# Patient Record
Sex: MALE | Race: BLACK OR AFRICAN AMERICAN | Employment: FULL TIME | URBAN - METROPOLITAN AREA
[De-identification: names, ages, dates, MRNs, and addresses within clinical notes are randomized per-mention and may not be internally consistent; named-entity substitution may affect disease eponyms.]

---

## 2017-05-16 ENCOUNTER — TELEPHONE (OUTPATIENT)
Dept: FAMILY MEDICINE CLINIC | Facility: CLINIC | Age: 47
End: 2017-05-16

## 2017-05-16 NOTE — TELEPHONE ENCOUNTER
Pt is calling state that he has a form   from his employment that need to be sign by Dr Belle Pruitt stating that it ok for him to keep his facial hair due to irritation   Pt state that this is a micky office form that they will be permanently place into his file.

## 2017-05-22 ENCOUNTER — OFFICE VISIT (OUTPATIENT)
Dept: FAMILY MEDICINE CLINIC | Facility: CLINIC | Age: 47
End: 2017-05-22

## 2017-05-22 VITALS
RESPIRATION RATE: 16 BRPM | TEMPERATURE: 98 F | HEIGHT: 70 IN | WEIGHT: 241 LBS | HEART RATE: 69 BPM | BODY MASS INDEX: 34.5 KG/M2 | SYSTOLIC BLOOD PRESSURE: 150 MMHG | DIASTOLIC BLOOD PRESSURE: 88 MMHG

## 2017-05-22 DIAGNOSIS — L73.1 PSEUDOFOLLICULITIS BARBAE: Primary | ICD-10-CM

## 2017-05-22 PROCEDURE — 99212 OFFICE O/P EST SF 10 MIN: CPT | Performed by: FAMILY MEDICINE

## 2017-05-22 PROCEDURE — 99213 OFFICE O/P EST LOW 20 MIN: CPT | Performed by: FAMILY MEDICINE

## 2017-05-22 NOTE — PROGRESS NOTES
HPI:    Tiffany Aschoff is a 41year old male presents to clinic requesting forms be filled out for work. Patient has PFB and cannot shave as frequently as he needs to for work, his job requires him to be clean shaven.  Dr. Syed Farr write him a letter but he Referrals:  None     Patient verbalized understanding. No barriers to learning observed.    5/22/2017  Kelsey Salgado MD

## 2017-05-24 ENCOUNTER — HOSPITAL ENCOUNTER (EMERGENCY)
Facility: HOSPITAL | Age: 47
Discharge: HOME | End: 2017-05-24
Attending: EMERGENCY MEDICINE
Payer: COMMERCIAL

## 2017-05-24 VITALS
TEMPERATURE: 98 F | SYSTOLIC BLOOD PRESSURE: 147 MMHG | OXYGEN SATURATION: 98 % | RESPIRATION RATE: 16 BRPM | HEIGHT: 70 IN | DIASTOLIC BLOOD PRESSURE: 77 MMHG | HEART RATE: 64 BPM | WEIGHT: 241 LBS | BODY MASS INDEX: 34.5 KG/M2

## 2017-05-24 DIAGNOSIS — T78.40XA ALLERGIC REACTION, INITIAL ENCOUNTER: ICD-10-CM

## 2017-05-24 DIAGNOSIS — L73.1 PSEUDOFOLLICULITIS BARBAE: Primary | ICD-10-CM

## 2017-05-24 PROCEDURE — 99283 EMERGENCY DEPT VISIT LOW MDM: CPT

## 2017-05-24 RX ORDER — PREDNISONE 20 MG/1
40 TABLET ORAL DAILY
Qty: 10 TABLET | Refills: 0 | Status: SHIPPED | OUTPATIENT
Start: 2017-05-24 | End: 2017-05-29

## 2017-05-24 RX ORDER — CLINDAMYCIN AND BENZOYL PEROXIDE 10; 50 MG/G; MG/G
1 GEL TOPICAL DAILY
Qty: 35 G | Refills: 0 | Status: SHIPPED | OUTPATIENT
Start: 2017-05-24 | End: 2017-05-31

## 2017-05-24 NOTE — ED NOTES
Milagro Kelsey is here for eval of itching/swelling to face, which began on Monday after applying [employer] to his herbert. No pain. No airway complaints. He is in NAD. Will continue to monitor.

## 2017-05-24 NOTE — ED PROVIDER NOTES
Patient Seen in: [facility] Emergency Department    History   No chief complaint on file. Stated Complaint: Facial swelling    HPI    Pt is 42 yo M who p/w facial swelling x 1 day. Pt states symptoms started after using hair dye for his beard. Current:/77 mmHg  Pulse 64  Temp(Src) 98.3 °F (36.8 °C) (Oral)  Resp 16  Ht 177.8 cm (5' 10\")  Wt 109.317 kg  BMI 34.58 kg/m2  SpO2 98%        Physical Exam    GENERAL: No acute distress, awake and alert  HEENT: MMM, EOMI, PERRL.  Oropharynx no

## 2017-05-24 NOTE — ED INITIAL ASSESSMENT (HPI)
Used just for men dye on Monday, next day had itching to face and swelling. Patient states that he tried taking benadryl capsules and hydrocortisone cream to area, states that swelling is worse upon waking up and has pus coming out.

## 2017-05-25 ENCOUNTER — OFFICE VISIT (OUTPATIENT)
Dept: FAMILY MEDICINE CLINIC | Facility: CLINIC | Age: 47
End: 2017-05-25

## 2017-05-25 VITALS
TEMPERATURE: 98 F | SYSTOLIC BLOOD PRESSURE: 130 MMHG | BODY MASS INDEX: 34.84 KG/M2 | DIASTOLIC BLOOD PRESSURE: 84 MMHG | HEART RATE: 78 BPM | RESPIRATION RATE: 20 BRPM | HEIGHT: 70 IN | WEIGHT: 243.38 LBS

## 2017-05-25 DIAGNOSIS — L03.211 FACIAL CELLULITIS: ICD-10-CM

## 2017-05-25 DIAGNOSIS — L23.4: Primary | ICD-10-CM

## 2017-05-25 PROBLEM — L25.2: Status: ACTIVE | Noted: 2017-05-25

## 2017-05-25 PROCEDURE — 99212 OFFICE O/P EST SF 10 MIN: CPT | Performed by: FAMILY MEDICINE

## 2017-05-25 PROCEDURE — 99213 OFFICE O/P EST LOW 20 MIN: CPT | Performed by: FAMILY MEDICINE

## 2017-05-25 RX ORDER — DOXYCYCLINE HYCLATE 100 MG/1
100 CAPSULE ORAL 2 TIMES DAILY
Qty: 14 CAPSULE | Refills: 0 | Status: SHIPPED | OUTPATIENT
Start: 2017-05-25 | End: 2017-06-01

## 2017-05-25 NOTE — PATIENT INSTRUCTIONS
Continue with prednisone and clindamycin topical. Add doxycycline oral. Hydrate well. Wash face with water and/or fran's baby shampoo.

## 2017-05-25 NOTE — PROGRESS NOTES
HPI:    Patient ID: Cherry Delong is a 41year old male. Skin  This is a new problem. The current episode started in the past 7 days. The problem occurs constantly. The problem has been gradually improving. Exacerbated by:  Initial insult from beard dye Sig: Take 1 capsule (100 mg total) by mouth 2 (two) times daily. Imaging & Referrals:  None  Patient Instructions   Continue with prednisone and clindamycin topical. Add doxycycline oral. Hydrate well.  Wash face with water and/or fran's bab

## 2017-09-18 ENCOUNTER — HOSPITAL ENCOUNTER (EMERGENCY)
Facility: HOSPITAL | Age: 47
Discharge: HOME | End: 2017-09-18
Payer: COMMERCIAL

## 2017-09-18 ENCOUNTER — APPOINTMENT (OUTPATIENT)
Dept: GENERAL RADIOLOGY | Facility: HOSPITAL | Age: 47
End: 2017-09-18
Payer: COMMERCIAL

## 2017-09-18 VITALS
DIASTOLIC BLOOD PRESSURE: 76 MMHG | BODY MASS INDEX: 33.64 KG/M2 | SYSTOLIC BLOOD PRESSURE: 145 MMHG | RESPIRATION RATE: 18 BRPM | HEIGHT: 70 IN | OXYGEN SATURATION: 98 % | TEMPERATURE: 97 F | HEART RATE: 61 BPM | WEIGHT: 235 LBS

## 2017-09-18 DIAGNOSIS — M62.838 TRAPEZIUS MUSCLE SPASM: Primary | ICD-10-CM

## 2017-09-18 PROCEDURE — 99283 EMERGENCY DEPT VISIT LOW MDM: CPT

## 2017-09-18 PROCEDURE — 73030 X-RAY EXAM OF SHOULDER: CPT

## 2017-09-18 PROCEDURE — 96372 THER/PROPH/DIAG INJ SC/IM: CPT

## 2017-09-18 RX ORDER — KETOROLAC TROMETHAMINE 30 MG/ML
60 INJECTION, SOLUTION INTRAMUSCULAR; INTRAVENOUS ONCE
Status: COMPLETED | OUTPATIENT
Start: 2017-09-18 | End: 2017-09-18

## 2017-09-18 RX ORDER — DIAZEPAM 2 MG/1
2 TABLET ORAL 3 TIMES DAILY PRN
Qty: 20 TABLET | Refills: 0 | Status: SHIPPED | OUTPATIENT
Start: 2017-09-18 | End: 2017-09-25

## 2017-09-18 NOTE — ED PROVIDER NOTES
Patient Seen in: [facility] AND [facility] Emergency Department    History   Patient presents with:  Upper Extremity Injury (musculoskeletal)    Stated Complaint: right shoulder pain    HPI    41year old male complains of pain and spasm in right shoulder muscle f Current:/76   Pulse 61   Temp (!) 97 °F (36.1 °C) (Temporal)   Resp 18   Ht 177.8 cm (5' 10\")   Wt 106.6 kg   SpO2 98%   BMI 33.72 kg/m²         Physical Exam   Constitutional: He is oriented to person, place, and time. He is cooperative.   Non-toxic INDICATIONS: Increasing right shoulder pain x one week. No known trauma. TECHNIQUE: 4 views were obtained. FINDINGS:     BONES: No acute fracture or dislocation is apparent.  There is a     corticated-appearing ossific fragment is 1.0 c Plan: NSAIDs and muscle relaxer, stretching exercises, follow-up with primary care supportive care. Any diagnostic tests performed here were reviewed and questions answered.  Diagnosis, care plan, treatment options, and a number of associated acute manageme

## 2017-09-20 ENCOUNTER — TELEPHONE (OUTPATIENT)
Dept: FAMILY MEDICINE CLINIC | Facility: CLINIC | Age: 47
End: 2017-09-20

## 2017-09-20 ENCOUNTER — OFFICE VISIT (OUTPATIENT)
Dept: FAMILY MEDICINE CLINIC | Facility: CLINIC | Age: 47
End: 2017-09-20

## 2017-09-20 VITALS
DIASTOLIC BLOOD PRESSURE: 84 MMHG | WEIGHT: 237 LBS | TEMPERATURE: 98 F | HEIGHT: 70 IN | BODY MASS INDEX: 33.93 KG/M2 | HEART RATE: 62 BPM | RESPIRATION RATE: 16 BRPM | SYSTOLIC BLOOD PRESSURE: 132 MMHG

## 2017-09-20 DIAGNOSIS — M99.01 CERVICOTHORACIC SOMATIC DYSFUNCTION: Primary | ICD-10-CM

## 2017-09-20 PROBLEM — M89.8X1 PAIN OF RIGHT SCAPULA: Status: ACTIVE | Noted: 2017-09-20

## 2017-09-20 PROCEDURE — 98927 OSTEOPATH MANJ 5-6 REGIONS: CPT | Performed by: FAMILY MEDICINE

## 2017-09-20 PROCEDURE — 99214 OFFICE O/P EST MOD 30 MIN: CPT | Performed by: FAMILY MEDICINE

## 2017-09-20 PROCEDURE — 99212 OFFICE O/P EST SF 10 MIN: CPT | Performed by: FAMILY MEDICINE

## 2017-09-20 RX ORDER — METHYLPREDNISOLONE 4 MG/1
TABLET ORAL
Qty: 1 KIT | Refills: 0 | Status: SHIPPED | OUTPATIENT
Start: 2017-09-20 | End: 2020-11-05 | Stop reason: ALTCHOICE

## 2017-09-20 RX ORDER — CYCLOBENZAPRINE HCL 10 MG
10 TABLET ORAL NIGHTLY
Qty: 30 TABLET | Refills: 0 | Status: SHIPPED | OUTPATIENT
Start: 2017-09-20 | End: 2017-09-20

## 2017-09-20 RX ORDER — CYCLOBENZAPRINE HCL 10 MG
10 TABLET ORAL NIGHTLY
Qty: 30 TABLET | Refills: 0 | Status: SHIPPED | OUTPATIENT
Start: 2017-09-20 | End: 2020-11-05 | Stop reason: ALTCHOICE

## 2017-09-20 NOTE — PROGRESS NOTES
HPI:    Patient ID: Shelby De La Rosa is a 41year old male. Back Pain   This is a new problem. Episode onset: 4 days ago after chiropractor treatment. The problem occurs constantly. The problem has been gradually worsening since onset.  The pain is present - CHIROPRACTIC  - INTERNAL      Orders Placed This Encounter      OSTEOPATHIC MANIP,5-6 BODY REGN    Meds This Visit:  Signed Prescriptions Disp Refills    methylPREDNISolone (MEDROL) 4 MG Oral Tablet Therapy Pack 1 kit 0      Sig: As directed.       Cyclob

## 2017-09-20 NOTE — TELEPHONE ENCOUNTER
Patient had office visit with Dr Ev Oswald today for recent ER visit for muscle spasm and received adjustment and prescription for medrol and cyclobenzaprine. Pt states he was in such a hurry that he forgot to ask for something for pain.  Pt states the medic

## 2017-09-20 NOTE — TELEPHONE ENCOUNTER
What he has is for pain and spasm. He will need to continue with taking this medication. If pain persist I will consider some plain films to make sure nothing else is going on in his lungs or chest wall.

## 2017-09-25 NOTE — TELEPHONE ENCOUNTER
Contacted pt report feeling a little better but pain still present. Denies any sob and pain level at 7. Tasked to Dr Arnie Ramos.

## 2017-09-26 NOTE — TELEPHONE ENCOUNTER
Reviewed doctor's recommendations with pt. Pt states he has not been taking the Cyclobenzaprine as [facility] pharmacy told him that prescription wasn't received. Pt states he will start taking the med tonight.  Pt states pain is worse when he is up and moving jeanie

## 2017-12-28 ENCOUNTER — HOSPITAL ENCOUNTER (EMERGENCY)
Facility: HOSPITAL | Age: 47
Discharge: HOME | End: 2017-12-28
Attending: EMERGENCY MEDICINE
Payer: COMMERCIAL

## 2017-12-28 VITALS
SYSTOLIC BLOOD PRESSURE: 154 MMHG | HEIGHT: 70 IN | TEMPERATURE: 99 F | DIASTOLIC BLOOD PRESSURE: 88 MMHG | OXYGEN SATURATION: 99 % | HEART RATE: 84 BPM | WEIGHT: 235 LBS | RESPIRATION RATE: 18 BRPM | BODY MASS INDEX: 33.64 KG/M2

## 2017-12-28 DIAGNOSIS — L03.211 CELLULITIS, FACE: Primary | ICD-10-CM

## 2017-12-28 PROCEDURE — 99283 EMERGENCY DEPT VISIT LOW MDM: CPT

## 2017-12-28 RX ORDER — DOXYCYCLINE HYCLATE 100 MG/1
100 CAPSULE ORAL 2 TIMES DAILY
Qty: 20 CAPSULE | Refills: 0 | Status: SHIPPED | OUTPATIENT
Start: 2017-12-28 | End: 2018-01-07

## 2017-12-28 RX ORDER — PREDNISONE 20 MG/1
40 TABLET ORAL DAILY
Qty: 10 TABLET | Refills: 0 | Status: SHIPPED | OUTPATIENT
Start: 2017-12-28 | End: 2018-01-02

## 2017-12-28 NOTE — ED NOTES
Pt reports using a dye to her face to his beard. Pt has swelling noted to the lower facial aspect of his face. No sign of resp distress noted. Bumps are noted in the beard area.  Pt reports taking benadryl pta

## 2017-12-30 NOTE — ED PROVIDER NOTES
Patient Seen in: [facility] AND [facility] Emergency Department    History   Patient presents with: Allergic Rxn Allergies (immune)    Stated Complaint: allergic rx - face swollen     HPI    17-year-old male presents for complaint of facial swelling.   Patient r air)    Current:/88   Pulse 84   Temp 99.3 °F (37.4 °C) (Oral)   Resp 18   Ht 177.8 cm (5' 10\")   Wt 106.6 kg   SpO2 99%   BMI 33.72 kg/m²         Physical Exam   Constitutional: He is oriented to person, place, and time.  He appears well-developed a Patient voices understanding and agreement with the treatment plan. All questions were addressed and answered.                     Disposition and Plan     Clinical Impression:  Cellulitis, face  (primary encounter diagnosis)    Disposition:  Discharge  12/

## 2020-11-05 ENCOUNTER — VIRTUAL PHONE E/M (OUTPATIENT)
Dept: FAMILY MEDICINE CLINIC | Facility: CLINIC | Age: 50
End: 2020-11-05
Payer: COMMERCIAL

## 2020-11-05 ENCOUNTER — LAB ENCOUNTER (OUTPATIENT)
Dept: LAB | Facility: HOSPITAL | Age: 50
End: 2020-11-05
Attending: NURSE PRACTITIONER
Payer: COMMERCIAL

## 2020-11-05 DIAGNOSIS — Z20.822 EXPOSURE TO COVID-19 VIRUS: ICD-10-CM

## 2020-11-05 DIAGNOSIS — Z20.822 ENCOUNTER BY TELEHEALTH FOR SUSPECTED COVID-19: Primary | ICD-10-CM

## 2020-11-05 DIAGNOSIS — Z20.822 EXPOSURE TO COVID-19 VIRUS: Primary | ICD-10-CM

## 2020-11-05 PROCEDURE — 99203 OFFICE O/P NEW LOW 30 MIN: CPT | Performed by: NURSE PRACTITIONER

## 2020-11-05 NOTE — PROGRESS NOTES
HPI    Virtual Telephone Check-In    Timmy Vegas verbally consents to a Virtual/Telephone Check-In visit on 11/05/20. Patient has been referred to the [facility] website at [email or web address] to review the yearly Consent to Treat document.     Patient u Occupational History      Not on file    Social Needs      Financial resource strain: Not on file      Food insecurity        Worry: Not on file        Inability: Not on file      Transportation needs        Medical: Not on file        Non-medical: Not on medications on file. Allergies:    Shrimp                      Physical Exam   Nursing note reviewed. Pulmonary/Chest: No respiratory distress. Patient able to speak in full sentences throughout entire telephone visit.   No respiratory distress no

## 2020-11-09 ENCOUNTER — TELEPHONE (OUTPATIENT)
Dept: FAMILY MEDICINE CLINIC | Facility: CLINIC | Age: 50
End: 2020-11-09

## 2020-11-09 RX ORDER — BENZONATATE 200 MG/1
200 CAPSULE ORAL 3 TIMES DAILY PRN
Qty: 30 CAPSULE | Refills: 0 | Status: SHIPPED | OUTPATIENT
Start: 2020-11-09

## 2020-11-09 NOTE — TELEPHONE ENCOUNTER
Spoke with patient (name and  verified). Informed of message below. Patient is asking for a strong cough medication that can be prescribed. Please advise.

## 2020-11-09 NOTE — TELEPHONE ENCOUNTER
Scot Rothman for [email or web address] please see pt message below. Pt wants to get something for his cough and that will help bring up his phlegm. Please Advise  For his body aches pt was advised to use Tylenol or Ibuprofen.

## 2020-11-09 NOTE — TELEPHONE ENCOUNTER
Pt calling for COVID results. Results are positive. States he is coughing, short of breath with exertion, lack of appetite, headache, \"sweating\" but doesn't have a thermometer. Encouraged to isolate, good hand hygiene, take Ibuprofen, hydrate.  Pt also re

## 2020-11-09 NOTE — TELEPHONE ENCOUNTER
muccinex works well to help break up mucus and one of the medications that seems to work well for the coughing,  Make certain to drink lots of liquids, hot showers, lots of rest. To ER if coughing or sob worsen.

## 2020-11-10 ENCOUNTER — APPOINTMENT (OUTPATIENT)
Dept: GENERAL RADIOLOGY | Facility: HOSPITAL | Age: 50
End: 2020-11-10
Attending: EMERGENCY MEDICINE
Payer: COMMERCIAL

## 2020-11-10 ENCOUNTER — HOSPITAL ENCOUNTER (EMERGENCY)
Facility: HOSPITAL | Age: 50
Discharge: HOME | End: 2020-11-10
Payer: COMMERCIAL

## 2020-11-10 ENCOUNTER — APPOINTMENT (OUTPATIENT)
Dept: CT IMAGING | Facility: HOSPITAL | Age: 50
End: 2020-11-10
Attending: NURSE PRACTITIONER
Payer: COMMERCIAL

## 2020-11-10 ENCOUNTER — HOSPITAL ENCOUNTER (EMERGENCY)
Facility: HOSPITAL | Age: 50
Discharge: HOME | End: 2020-11-10
Attending: EMERGENCY MEDICINE
Payer: COMMERCIAL

## 2020-11-10 VITALS
TEMPERATURE: 100 F | BODY MASS INDEX: 34.36 KG/M2 | HEART RATE: 107 BPM | SYSTOLIC BLOOD PRESSURE: 151 MMHG | RESPIRATION RATE: 16 BRPM | WEIGHT: 240 LBS | OXYGEN SATURATION: 95 % | DIASTOLIC BLOOD PRESSURE: 81 MMHG | HEIGHT: 70 IN

## 2020-11-10 VITALS
DIASTOLIC BLOOD PRESSURE: 82 MMHG | OXYGEN SATURATION: 94 % | HEIGHT: 70 IN | WEIGHT: 240 LBS | BODY MASS INDEX: 34.36 KG/M2 | RESPIRATION RATE: 24 BRPM | SYSTOLIC BLOOD PRESSURE: 143 MMHG | TEMPERATURE: 99 F | HEART RATE: 102 BPM

## 2020-11-10 DIAGNOSIS — U07.1 PNEUMONIA DUE TO COVID-19 VIRUS: Primary | ICD-10-CM

## 2020-11-10 DIAGNOSIS — J12.82 PNEUMONIA DUE TO COVID-19 VIRUS: Primary | ICD-10-CM

## 2020-11-10 DIAGNOSIS — U07.1 COVID-19 VIRUS INFECTION: Primary | ICD-10-CM

## 2020-11-10 PROCEDURE — 99283 EMERGENCY DEPT VISIT LOW MDM: CPT

## 2020-11-10 PROCEDURE — 93005 ELECTROCARDIOGRAM TRACING: CPT

## 2020-11-10 PROCEDURE — 85025 COMPLETE CBC W/AUTO DIFF WBC: CPT | Performed by: NURSE PRACTITIONER

## 2020-11-10 PROCEDURE — 99284 EMERGENCY DEPT VISIT MOD MDM: CPT

## 2020-11-10 PROCEDURE — 84484 ASSAY OF TROPONIN QUANT: CPT | Performed by: NURSE PRACTITIONER

## 2020-11-10 PROCEDURE — 71045 X-RAY EXAM CHEST 1 VIEW: CPT | Performed by: EMERGENCY MEDICINE

## 2020-11-10 PROCEDURE — 80048 BASIC METABOLIC PNL TOTAL CA: CPT | Performed by: NURSE PRACTITIONER

## 2020-11-10 PROCEDURE — 96360 HYDRATION IV INFUSION INIT: CPT

## 2020-11-10 PROCEDURE — 85379 FIBRIN DEGRADATION QUANT: CPT | Performed by: NURSE PRACTITIONER

## 2020-11-10 PROCEDURE — 71260 CT THORAX DX C+: CPT | Performed by: NURSE PRACTITIONER

## 2020-11-10 PROCEDURE — 93010 ELECTROCARDIOGRAM REPORT: CPT | Performed by: EMERGENCY MEDICINE

## 2020-11-10 RX ORDER — ALBUTEROL SULFATE 90 UG/1
2 AEROSOL, METERED RESPIRATORY (INHALATION) EVERY 4 HOURS PRN
Qty: 1 INHALER | Refills: 0 | Status: SHIPPED | OUTPATIENT
Start: 2020-11-10 | End: 2020-12-10

## 2020-11-10 NOTE — ED PROVIDER NOTES
Patient Seen in: [facility] AND [facility] Emergency Department      History   Patient presents with:  Difficulty Breathing    Stated Complaint: covid, shortness of breath    HPI    61-year-old male known positive Covid from a test dated November shift here with Pupils are equal, round, and reactive to light. Neck: Normal range of motion. Neck supple. Cardiovascular: Normal rate, regular rhythm and intact distal pulses. Pulmonary/Chest: Effort normal. No respiratory distress.   No significant wheeze or crack by (CST): Willow Corrales MD on 11/10/2020 at 8:35 AM                  MDM      Patient was not hypoxic with ambulatory pulse ox. He will be sent home with a pulse oximeter. He will be given Ventolin for his cough.   He knows to quarantine and stay hydrated

## 2020-11-10 NOTE — ED INITIAL ASSESSMENT (HPI)
Pt from home with complaints of shortness of breath and difficulty breathing. Positive covid test 11/6/20.

## 2020-11-11 NOTE — ED NOTES
Pt a/ox4, respirations unlabored, speaking full clear sentences, gait steady, no acute distress. All ED orders completed.    Pt instructed to return to ED for any new/severe s/s or as directed by provider, and to see PMD/specialist ASAP or as directed by pr

## 2020-11-11 NOTE — ED PROVIDER NOTES
Patient Seen in: [facility] AND [facility] Emergency Department      History   Patient presents with:  Difficulty Breathing    Stated Complaint:     51yo/m with hx of anxiety, HTN reports to the ED with complaints of dyspnea.  Patient reports a covid + diagnosis acute distress. Appearance: He is well-developed. HENT:      Head: Normocephalic and atraumatic. Eyes:      Conjunctiva/sclera: Conjunctivae normal.      Pupils: Pupils are equal, round, and reactive to light.    Neck:      Musculoskeletal: Normal r for these tests on the individual orders.    SCAN SLIDE   CBC W/ DIFFERENTIAL     Narrative     PROCEDURE: CT CHEST PE AORTA (IV ONLY) (CPT=71260)       COMPARISON: None.       INDICATIONS: +covid, elevated dimer, tachypnea, dyspnea       TECHNIQUE: CT imag dilated 2.9 centimeters transversely.    THORACIC AORTA: Normal size for age.  No aneurysm or dissection.     PLEURA: No mass or effusion.     CHEST WALL: No axillary mass or enlarged adenopathy.     LIMITED ABDOMEN: Limited images of the upper abdomen demo Impression:  Pneumonia due to COVID-19 virus  (primary encounter diagnosis)    Disposition:  Discharge  11/10/2020 10:10 pm    Follow-up:  Nisa Cutler DO  [address]    In 2 days            Medicat

## 2020-11-11 NOTE — ED INITIAL ASSESSMENT (HPI)
Pt to ED with c/o increasing dyspnea that started today. Pt denies chest pain. Pt states +for COVID-19 11-5-2020. Pt 93% on room air. Pt is alert and oriented x4. Pt seen here early today with same complaint. Pt able to speak in full sentences.

## 2020-11-27 ENCOUNTER — NURSE TRIAGE (OUTPATIENT)
Dept: FAMILY MEDICINE CLINIC | Facility: CLINIC | Age: 50
End: 2020-11-27

## 2020-11-27 NOTE — TELEPHONE ENCOUNTER
Action Requested: Summary for Provider     []  Critical Lab, Recommendations Needed  [] Need Additional Advice  []   FYI    []   Need Orders  [] Need Medications Sent to Pharmacy  []  Other     SUMMARY:     Patient called stating since having COVID he has

## 2020-11-27 NOTE — PROGRESS NOTES
Please note that the following visit was completed using two-way, real-time interactive audio and/or video communication.   This has been done in good fiorella to provide continuity of care in the best interest of the provider-patient relationship, due to the 1 tablet (0.5 mg total) by mouth nightly as needed for Sleep. 10 tablet 0   • Albuterol Sulfate  (90 Base) MCG/ACT Inhalation Aero Soln Inhale 2 puffs into the lungs every 4 (four) hours as needed for Wheezing.  1 Inhaler 0   • benzonatate 200 MG Ora worsening symptoms or concerns.   -Pt was agreeable to plan and will comply with discussion above.          Patient reminded to practice good health and safety measures including washing hands, social distancing, covering mouth when coughing/ sneezing, avoi

## 2020-11-29 ENCOUNTER — HOSPITAL ENCOUNTER (EMERGENCY)
Facility: HOSPITAL | Age: 50
Discharge: HOME | End: 2020-11-29
Payer: COMMERCIAL

## 2020-11-29 VITALS
HEIGHT: 70 IN | OXYGEN SATURATION: 97 % | HEART RATE: 75 BPM | DIASTOLIC BLOOD PRESSURE: 94 MMHG | RESPIRATION RATE: 18 BRPM | SYSTOLIC BLOOD PRESSURE: 148 MMHG | WEIGHT: 230 LBS | TEMPERATURE: 99 F | BODY MASS INDEX: 32.93 KG/M2

## 2020-11-29 DIAGNOSIS — R25.1 SHAKINESS: Primary | ICD-10-CM

## 2020-11-29 PROCEDURE — 80053 COMPREHEN METABOLIC PANEL: CPT | Performed by: NURSE PRACTITIONER

## 2020-11-29 PROCEDURE — 96360 HYDRATION IV INFUSION INIT: CPT

## 2020-11-29 PROCEDURE — 85025 COMPLETE CBC W/AUTO DIFF WBC: CPT | Performed by: NURSE PRACTITIONER

## 2020-11-29 PROCEDURE — 99284 EMERGENCY DEPT VISIT MOD MDM: CPT

## 2020-11-30 NOTE — ED PROVIDER NOTES
Patient Seen in: [facility] AND [facility] Emergency Department      History   No chief complaint on file.     Stated Complaint: Check up    51yo/m with hx of anxiety, recent covid infection reports with shakiness going up/down stairs, facial tightness, loss of a Normocephalic and atraumatic. Eyes:      Conjunctiva/sclera: Conjunctivae normal.      Pupils: Pupils are equal, round, and reactive to light. Neck:      Musculoskeletal: Normal range of motion and neck supple.    Cardiovascular:      Rate and Rhythm: N result                 Please view results for these tests on the individual orders.    RAINBOW DRAW BLUE   RAINBOW DRAW LAVENDER   RAINBOW DRAW LIGHT GREEN   RAINBOW DRAW GOLD                  MDM      51yo/m w hx and exam as stated, complaints of shakines

## 2020-11-30 NOTE — ED NOTES
Very extensive discharge discussion with patient. Concern for being able to sleep, also concerns over taking xanax and waking up 'groggy' in the am. This RN discussed pros and cons of medication and care.

## 2020-11-30 NOTE — ED INITIAL ASSESSMENT (HPI)
Pt presents to ED via private vehicle for c/o residual loss of appetite and feeling like \"something is missing in my body\" since having COVID 3 weeks ago.     Pt has history of anxiety

## 2020-12-01 NOTE — PROGRESS NOTES
Please note that the following visit was completed using two-way, real-time interactive audio and/or video communication.   This has been done in good fiorella to provide continuity of care in the best interest of the provider-patient relationship, due to the aware of where to find [facility] notice of privacy practices, telehealth consent form and other related consent forms and documents. which are located on the [facility] website.  The patient verbally agreed to telehealth consent form, related consents and the risks d (Patient not taking: Reported on 11/10/2020 ) 30 capsule 0       Allergies:  Shrimp                         Current Outpatient Medications:   •  LORazepam 0.5 MG Oral Tab, Take 1 tablet (0.5 mg total) by mouth 2 (two) times daily as needed for Anxiety. Radha Nguyen pandemic. Patient verbalized understanding of plan and all questions answered to the best of my ability. Patient to call back if any change/ worsening of symptoms. No orders of the defined types were placed in this encounter.       Meds This Visit:

## 2021-09-02 ENCOUNTER — HOSPITAL ENCOUNTER (EMERGENCY)
Facility: HOSPITAL | Age: 51
Discharge: HOME | End: 2021-09-02
Attending: EMERGENCY MEDICINE
Payer: COMMERCIAL

## 2021-09-02 VITALS
RESPIRATION RATE: 24 BRPM | OXYGEN SATURATION: 95 % | BODY MASS INDEX: 36 KG/M2 | SYSTOLIC BLOOD PRESSURE: 122 MMHG | DIASTOLIC BLOOD PRESSURE: 77 MMHG | HEART RATE: 104 BPM | WEIGHT: 250 LBS | TEMPERATURE: 101 F

## 2021-09-02 DIAGNOSIS — B34.9 VIRAL SYNDROME: Primary | ICD-10-CM

## 2021-09-02 LAB — SARS-COV-2 RNA RESP QL NAA+PROBE: NOT DETECTED

## 2021-09-02 PROCEDURE — 99283 EMERGENCY DEPT VISIT LOW MDM: CPT

## 2021-09-02 RX ORDER — BENZONATATE 100 MG/1
100 CAPSULE ORAL 3 TIMES DAILY PRN
Qty: 30 CAPSULE | Refills: 0 | Status: SHIPPED | OUTPATIENT
Start: 2021-09-02 | End: 2021-10-02

## 2021-09-02 RX ORDER — CODEINE PHOSPHATE AND GUAIFENESIN 10; 100 MG/5ML; MG/5ML
5 SOLUTION ORAL EVERY EVENING
Qty: 50 ML | Refills: 0 | Status: SHIPPED | OUTPATIENT
Start: 2021-09-02 | End: 2021-09-07

## 2021-09-02 NOTE — ED PROVIDER NOTES
Patient Seen in: [facility] AND [facility] Emergency Department      History   Patient presents with:  Cough    Stated Complaint: cough, fever    HPI/Subjective:   HPI    Pt is 55 yo M who p/w 4 days of fever, chills, cough, headache and body aches.  No vomiting am    Follow-up:  Nisa Cutler DO  [address] [id number] [facility]  [phone]    In 2 days            Medications Prescribed:  Current Discharge Medication List    START taking these medications    !! benzonatate 100 MG Oral Cap

## 2021-09-02 NOTE — ED INITIAL ASSESSMENT (HPI)
Pt to ED for cough, HA, subjective fevers and chills since Sunday, negative covid test Monday. Pt is unvaccinated for covid.

## 2021-09-04 ENCOUNTER — TELEPHONE (OUTPATIENT)
Dept: FAMILY MEDICINE CLINIC | Facility: CLINIC | Age: 51
End: 2021-09-04

## 2021-09-04 NOTE — TELEPHONE ENCOUNTER
Spoke with patient--reports feeling feverish (did not take temperature, \"but I'm sweating a lot\"), persistent, intermittent headaches--is taking Tylenol and cough syrup Rxed at 9/02/2021 ER visit, but stopped Marek Hlal, \"because they make my heada

## 2021-09-04 NOTE — TELEPHONE ENCOUNTER
I really do not know where this patient can be placed on my schedule for next week, but if you can find a res 24/or any other so-called reserved appointment, then put him in. Thank you.

## 2021-09-04 NOTE — TELEPHONE ENCOUNTER
Spoke with the patient,verified full name and , informed him of message below and patient declined the appointment. Advised him of symptoms worsen to see medical attention.     Also advised him to place his name on waiting list if any cancellation shoul

## 2021-11-09 ENCOUNTER — TELEPHONE (OUTPATIENT)
Dept: FAMILY MEDICINE CLINIC | Facility: CLINIC | Age: 51
End: 2021-11-09

## 2021-11-09 NOTE — TELEPHONE ENCOUNTER
Patient asking an appointment for prostate exam but next opening is on 12/24. Dr. Jacquie Kaplan can we use a res 24 slot?        Please reply to pool: EM [facility] IM [facility] ALG RHE [[id number]]

## 2021-11-10 NOTE — TELEPHONE ENCOUNTER
Spoke, with the patient and he did schedule an appointment to see Jewel Garza on 11-15-21 at 6:20 res 24 slot was used. Ok 'd per the doctors message below.

## 2021-11-15 ENCOUNTER — OFFICE VISIT (OUTPATIENT)
Dept: FAMILY MEDICINE CLINIC | Facility: CLINIC | Age: 51
End: 2021-11-15
Payer: COMMERCIAL

## 2021-11-15 VITALS
WEIGHT: 256 LBS | HEART RATE: 60 BPM | SYSTOLIC BLOOD PRESSURE: 120 MMHG | HEIGHT: 70 IN | DIASTOLIC BLOOD PRESSURE: 88 MMHG | BODY MASS INDEX: 36.65 KG/M2

## 2021-11-15 DIAGNOSIS — M54.50 CHRONIC MIDLINE LOW BACK PAIN WITHOUT SCIATICA: ICD-10-CM

## 2021-11-15 DIAGNOSIS — R10.33 PERIUMBILICAL PAIN: Primary | ICD-10-CM

## 2021-11-15 DIAGNOSIS — G89.29 CHRONIC MIDLINE LOW BACK PAIN WITHOUT SCIATICA: ICD-10-CM

## 2021-11-15 DIAGNOSIS — Z12.5 PROSTATE CANCER SCREENING: ICD-10-CM

## 2021-11-15 DIAGNOSIS — R14.3 FLATUS: ICD-10-CM

## 2021-11-15 PROCEDURE — 3079F DIAST BP 80-89 MM HG: CPT | Performed by: FAMILY MEDICINE

## 2021-11-15 PROCEDURE — 3008F BODY MASS INDEX DOCD: CPT | Performed by: FAMILY MEDICINE

## 2021-11-15 PROCEDURE — 3074F SYST BP LT 130 MM HG: CPT | Performed by: FAMILY MEDICINE

## 2021-11-15 PROCEDURE — 99214 OFFICE O/P EST MOD 30 MIN: CPT | Performed by: FAMILY MEDICINE

## 2021-11-15 RX ORDER — CYCLOBENZAPRINE HCL 5 MG
5 TABLET ORAL NIGHTLY
Qty: 30 TABLET | Refills: 0 | Status: SHIPPED | OUTPATIENT
Start: 2021-11-15

## 2021-11-16 NOTE — PROGRESS NOTES
Subjective:   Patient ID: Lilian Bullard is a 54year old male. This patient is a 33-year-old gentleman who presents to the clinic with just over a week of periumbilical discomfort of unknown cause.   Patient admits to a lot of dairy products and he has tenderness. Hernia: No hernia is present. Musculoskeletal:      Lumbar back: Spasms and tenderness present. Decreased range of motion. Back:       Comments: Region of discomfort as depicted. Neurological:      Mental Status: He is alert.

## 2021-11-16 NOTE — PATIENT INSTRUCTIONS
GI referral.  PSA has been ordered to evaluate the patient's prostate. Patient has been given the recommendation to use lactose-free or Lactaid version of dairy products. Cyclobenzaprine prescribed to be taken at night starting off with 5 mg.   Patient ha

## 2022-01-20 ENCOUNTER — TELEPHONE (OUTPATIENT)
Dept: FAMILY MEDICINE CLINIC | Facility: CLINIC | Age: 52
End: 2022-01-20

## 2022-01-20 ENCOUNTER — HOSPITAL ENCOUNTER (OUTPATIENT)
Age: 52
Discharge: HOME | End: 2022-01-20
Payer: COMMERCIAL

## 2022-01-20 ENCOUNTER — APPOINTMENT (OUTPATIENT)
Dept: LAB | Facility: HOSPITAL | Age: 52
End: 2022-01-20
Attending: FAMILY MEDICINE
Payer: COMMERCIAL

## 2022-01-20 ENCOUNTER — PATIENT MESSAGE (OUTPATIENT)
Dept: GASTROENTEROLOGY | Facility: CLINIC | Age: 52
End: 2022-01-20

## 2022-01-20 VITALS
OXYGEN SATURATION: 97 % | DIASTOLIC BLOOD PRESSURE: 78 MMHG | TEMPERATURE: 98 F | SYSTOLIC BLOOD PRESSURE: 150 MMHG | HEART RATE: 82 BPM | RESPIRATION RATE: 18 BRPM

## 2022-01-20 DIAGNOSIS — R10.9 ABDOMINAL PAIN OF UNKNOWN ETIOLOGY: Primary | ICD-10-CM

## 2022-01-20 DIAGNOSIS — Z12.5 PROSTATE CANCER SCREENING: ICD-10-CM

## 2022-01-20 DIAGNOSIS — R10.84 GENERALIZED ABDOMINAL PAIN: Primary | ICD-10-CM

## 2022-01-20 LAB — COMPLEXED PSA SERPL-MCNC: 1.37 NG/ML (ref ?–4)

## 2022-01-20 PROCEDURE — 36415 COLL VENOUS BLD VENIPUNCTURE: CPT

## 2022-01-20 PROCEDURE — 99212 OFFICE O/P EST SF 10 MIN: CPT

## 2022-01-20 NOTE — TELEPHONE ENCOUNTER
This patient will get a full evaluation via the GI specialist and if it is deemed necessary for the patient to be tested for pancreatic and/or any other form of testing for colon cancer, then the GI specialist will order those tests.   H. pylori test has be

## 2022-01-20 NOTE — TELEPHONE ENCOUNTER
Patient calling for PSA results. I advised patient he needs to call Dr. Crum Postin office for results since he is the one that ordered it. Patient voiced understanding.

## 2022-01-20 NOTE — TELEPHONE ENCOUNTER
Was seen on 11/15/21 due to abdominal pain, states that he was given medication and it helped and pain was gone.   Now pain came back,above navel, like stomach area, came back last week, on and off, little discomfort even when doing bowel movement,runny BM

## 2022-01-20 NOTE — TELEPHONE ENCOUNTER
pt. states that he is at the lab and there is not order entered to check Pancreatis and check colon. Pt. Requesting to get orders entered right away. Pt. States that he is waiting at [facility].

## 2022-01-20 NOTE — TELEPHONE ENCOUNTER
Spoke with patient ( verified)--still at [facility] lab--asking for add'l labs \"for pancreas and my colon--to see if I have the cancer. \"    Please advise on add'l labs, other than PSA    Please reply to [place]: EM ANA Lauren routed this co

## 2022-01-20 NOTE — ED PROVIDER NOTES
Patient Seen in: [facility]      History   Patient presents with:  Abdomen/Flank Pain    Stated Complaint: abd pain    Subjective:   Well-appearing 44-year-old male presents for intermittent generalized abdominal pain since November 2021.   Pa glasses weekly    Drug use: No             Review of Systems    Positive for stated complaint: abd pain  Other systems are as noted in HPI. Constitutional and vital signs reviewed. All other systems reviewed and negative except as noted above.     Antolin pending. Patient does have a scheduled appointment with gastroenterology for February 24, 2022, keep appointment.   I did discuss signs and symptoms for prompt ED eval including but not limited to severe abdominal pain, nausea, vomiting, diarrhea, constipa

## 2022-01-20 NOTE — TELEPHONE ENCOUNTER
From: Anthony Sandhoff  To: Lois Gavin  Sent: 1/20/2022 2:41 PM CST  Subject: Question regarding PSA SCREEN    I am unsure of what the final test results are stating. Can someone provide better clarification?   Thanks

## 2022-01-20 NOTE — TELEPHONE ENCOUNTER
Patient calling, confirmed name and . This is the patient's 3rd phone call today requesting more labs. Reviewed Dr. Millie Vicente recommendations here. Patient verbalized understanding and agrees.     Patient stated he made an appointment to see GI:  Fut

## 2022-01-21 ENCOUNTER — LAB ENCOUNTER (OUTPATIENT)
Dept: LAB | Facility: HOSPITAL | Age: 52
End: 2022-01-21
Attending: FAMILY MEDICINE
Payer: COMMERCIAL

## 2022-01-21 DIAGNOSIS — R10.84 GENERALIZED ABDOMINAL PAIN: ICD-10-CM

## 2022-01-21 PROCEDURE — 87338 HPYLORI STOOL AG IA: CPT

## 2022-01-22 LAB — HELICOBACTER PYLORI AG, FECAL: NEGATIVE

## 2023-02-28 ENCOUNTER — OFFICE VISIT (OUTPATIENT)
Dept: FAMILY MEDICINE CLINIC | Facility: CLINIC | Age: 53
End: 2023-02-28

## 2023-02-28 ENCOUNTER — LAB ENCOUNTER (OUTPATIENT)
Dept: LAB | Age: 53
End: 2023-02-28
Attending: FAMILY MEDICINE
Payer: COMMERCIAL

## 2023-02-28 VITALS
SYSTOLIC BLOOD PRESSURE: 130 MMHG | WEIGHT: 264 LBS | BODY MASS INDEX: 37.8 KG/M2 | DIASTOLIC BLOOD PRESSURE: 80 MMHG | HEART RATE: 72 BPM | HEIGHT: 70 IN | TEMPERATURE: 98 F

## 2023-02-28 DIAGNOSIS — Z00.00 ROUTINE PHYSICAL EXAMINATION: ICD-10-CM

## 2023-02-28 DIAGNOSIS — Z12.11 COLON CANCER SCREENING: Primary | ICD-10-CM

## 2023-02-28 DIAGNOSIS — R39.15 URINARY URGENCY: ICD-10-CM

## 2023-02-28 LAB
ALBUMIN SERPL-MCNC: 4.3 G/DL (ref 3.4–5)
ALBUMIN/GLOB SERPL: 1 {RATIO} (ref 1–2)
ALP LIVER SERPL-CCNC: 71 U/L
ALT SERPL-CCNC: 38 U/L
ANION GAP SERPL CALC-SCNC: 5 MMOL/L (ref 0–18)
AST SERPL-CCNC: 19 U/L (ref 15–37)
BASOPHILS # BLD AUTO: 0.03 X10(3) UL (ref 0–0.2)
BASOPHILS NFR BLD AUTO: 0.4 %
BILIRUB SERPL-MCNC: 0.4 MG/DL (ref 0.1–2)
BILIRUB UR QL: NEGATIVE
BUN BLD-MCNC: 9 MG/DL (ref 7–18)
BUN/CREAT SERPL: 9.6 (ref 10–20)
CALCIUM BLD-MCNC: 9.6 MG/DL (ref 8.5–10.1)
CHLORIDE SERPL-SCNC: 104 MMOL/L (ref 98–112)
CHOLEST SERPL-MCNC: 142 MG/DL (ref ?–200)
CLARITY UR: CLEAR
CO2 SERPL-SCNC: 29 MMOL/L (ref 21–32)
CREAT BLD-MCNC: 0.94 MG/DL
DEPRECATED RDW RBC AUTO: 45.1 FL (ref 35.1–46.3)
EOSINOPHIL # BLD AUTO: 0.08 X10(3) UL (ref 0–0.7)
EOSINOPHIL NFR BLD AUTO: 1 %
ERYTHROCYTE [DISTWIDTH] IN BLOOD BY AUTOMATED COUNT: 13.4 % (ref 11–15)
FASTING PATIENT LIPID ANSWER: NO
FASTING STATUS PATIENT QL REPORTED: NO
GFR SERPLBLD BASED ON 1.73 SQ M-ARVRAT: 99 ML/MIN/1.73M2 (ref 60–?)
GLOBULIN PLAS-MCNC: 4.2 G/DL (ref 2.8–4.4)
GLUCOSE BLD-MCNC: 108 MG/DL (ref 70–99)
GLUCOSE UR-MCNC: NORMAL MG/DL
HCT VFR BLD AUTO: 46.1 %
HDLC SERPL-MCNC: 42 MG/DL (ref 40–59)
HGB BLD-MCNC: 15 G/DL
HGB UR QL STRIP.AUTO: NEGATIVE
IMM GRANULOCYTES # BLD AUTO: 0.02 X10(3) UL (ref 0–1)
IMM GRANULOCYTES NFR BLD: 0.2 %
KETONES UR-MCNC: NEGATIVE MG/DL
LDLC SERPL CALC-MCNC: 79 MG/DL (ref ?–100)
LEUKOCYTE ESTERASE UR QL STRIP.AUTO: NEGATIVE
LYMPHOCYTES # BLD AUTO: 3.81 X10(3) UL (ref 1–4)
LYMPHOCYTES NFR BLD AUTO: 46.3 %
MCH RBC QN AUTO: 29.5 PG (ref 26–34)
MCHC RBC AUTO-ENTMCNC: 32.5 G/DL (ref 31–37)
MCV RBC AUTO: 90.6 FL
MONOCYTES # BLD AUTO: 0.63 X10(3) UL (ref 0.1–1)
MONOCYTES NFR BLD AUTO: 7.7 %
NEUTROPHILS # BLD AUTO: 3.66 X10 (3) UL (ref 1.5–7.7)
NEUTROPHILS # BLD AUTO: 3.66 X10(3) UL (ref 1.5–7.7)
NEUTROPHILS NFR BLD AUTO: 44.4 %
NITRITE UR QL STRIP.AUTO: NEGATIVE
NONHDLC SERPL-MCNC: 100 MG/DL (ref ?–130)
OSMOLALITY SERPL CALC.SUM OF ELEC: 285 MOSM/KG (ref 275–295)
PH UR: 5 [PH] (ref 5–8)
PLATELET # BLD AUTO: 275 10(3)UL (ref 150–450)
POTASSIUM SERPL-SCNC: 4 MMOL/L (ref 3.5–5.1)
PROT SERPL-MCNC: 8.5 G/DL (ref 6.4–8.2)
PROT UR-MCNC: NEGATIVE MG/DL
PSA SERPL-MCNC: 1.22 NG/ML (ref ?–4)
RBC # BLD AUTO: 5.09 X10(6)UL
SODIUM SERPL-SCNC: 138 MMOL/L (ref 136–145)
SP GR UR STRIP: 1.02 (ref 1–1.03)
TRIGL SERPL-MCNC: 117 MG/DL (ref 30–149)
TSI SER-ACNC: 1.41 MIU/ML (ref 0.36–3.74)
UROBILINOGEN UR STRIP-ACNC: NORMAL
VLDLC SERPL CALC-MCNC: 18 MG/DL (ref 0–30)
WBC # BLD AUTO: 8.2 X10(3) UL (ref 4–11)

## 2023-02-28 PROCEDURE — 80061 LIPID PANEL: CPT

## 2023-02-28 PROCEDURE — 3008F BODY MASS INDEX DOCD: CPT | Performed by: FAMILY MEDICINE

## 2023-02-28 PROCEDURE — 36415 COLL VENOUS BLD VENIPUNCTURE: CPT

## 2023-02-28 PROCEDURE — 3075F SYST BP GE 130 - 139MM HG: CPT | Performed by: FAMILY MEDICINE

## 2023-02-28 PROCEDURE — 84443 ASSAY THYROID STIM HORMONE: CPT

## 2023-02-28 PROCEDURE — 3079F DIAST BP 80-89 MM HG: CPT | Performed by: FAMILY MEDICINE

## 2023-02-28 PROCEDURE — 87086 URINE CULTURE/COLONY COUNT: CPT

## 2023-02-28 PROCEDURE — 84153 ASSAY OF PSA TOTAL: CPT

## 2023-02-28 PROCEDURE — 85025 COMPLETE CBC W/AUTO DIFF WBC: CPT

## 2023-02-28 PROCEDURE — 99396 PREV VISIT EST AGE 40-64: CPT | Performed by: FAMILY MEDICINE

## 2023-02-28 PROCEDURE — 80053 COMPREHEN METABOLIC PANEL: CPT

## 2023-02-28 NOTE — PATIENT INSTRUCTIONS
All adult screening ordered and done appropriate for patient's age and gender and risk factors and complaints. Recommend weight loss via daily exercising and consistent healthy dietary changes. Encouraged physical fitness and daily physical activity daily. Monitor blood pressures and record at home. Limit salt intake. Patient being referred for initial colonoscopy. Patient has a standby referral for urology in lieu of his no urinary symptoms.

## 2023-03-01 DIAGNOSIS — R73.9 ELEVATED BLOOD SUGAR: Primary | ICD-10-CM

## 2023-04-05 ENCOUNTER — HOSPITAL ENCOUNTER (EMERGENCY)
Facility: HOSPITAL | Age: 53
Discharge: HOME | End: 2023-04-05
Attending: EMERGENCY MEDICINE
Payer: COMMERCIAL

## 2023-04-05 VITALS
DIASTOLIC BLOOD PRESSURE: 80 MMHG | HEART RATE: 72 BPM | RESPIRATION RATE: 20 BRPM | BODY MASS INDEX: 37.22 KG/M2 | WEIGHT: 260 LBS | TEMPERATURE: 98 F | OXYGEN SATURATION: 99 % | HEIGHT: 70 IN | SYSTOLIC BLOOD PRESSURE: 132 MMHG

## 2023-04-05 DIAGNOSIS — R35.0 URINARY FREQUENCY: Primary | ICD-10-CM

## 2023-04-05 LAB
BILIRUB UR QL: NEGATIVE
CLARITY UR: CLEAR
COLOR UR: YELLOW
GLUCOSE UR-MCNC: NEGATIVE MG/DL
KETONES UR-MCNC: NEGATIVE MG/DL
LEUKOCYTE ESTERASE UR QL STRIP.AUTO: NEGATIVE
NITRITE UR QL STRIP.AUTO: NEGATIVE
PH UR: 5 [PH] (ref 5–8)
PROT UR-MCNC: NEGATIVE MG/DL
SP GR UR STRIP: 1.02 (ref 1–1.03)
UROBILINOGEN UR STRIP-ACNC: <2
VIT C UR-MCNC: NEGATIVE MG/DL

## 2023-04-05 PROCEDURE — 81001 URINALYSIS AUTO W/SCOPE: CPT

## 2023-04-05 PROCEDURE — 99283 EMERGENCY DEPT VISIT LOW MDM: CPT

## 2023-04-05 PROCEDURE — 51798 US URINE CAPACITY MEASURE: CPT

## 2023-04-05 PROCEDURE — 81001 URINALYSIS AUTO W/SCOPE: CPT | Performed by: EMERGENCY MEDICINE

## 2023-04-05 NOTE — ED INITIAL ASSESSMENT (HPI)
Pt reports urinary urgency and feels like he is not emptying his bladder completely x 1 month, was seen by pcp for prostate was told everything was normal, denies hematuria, fever

## 2023-04-18 ENCOUNTER — OFFICE VISIT (OUTPATIENT)
Dept: SURGERY | Facility: CLINIC | Age: 53
End: 2023-04-18

## 2023-04-18 VITALS
SYSTOLIC BLOOD PRESSURE: 167 MMHG | HEIGHT: 70 IN | WEIGHT: 260 LBS | DIASTOLIC BLOOD PRESSURE: 81 MMHG | HEART RATE: 72 BPM | BODY MASS INDEX: 37.22 KG/M2

## 2023-04-18 DIAGNOSIS — N13.8 BPH WITH OBSTRUCTION/LOWER URINARY TRACT SYMPTOMS: ICD-10-CM

## 2023-04-18 DIAGNOSIS — N40.1 BPH WITH OBSTRUCTION/LOWER URINARY TRACT SYMPTOMS: ICD-10-CM

## 2023-04-18 DIAGNOSIS — R35.0 URINARY FREQUENCY: Primary | ICD-10-CM

## 2023-04-18 PROCEDURE — 99243 OFF/OP CNSLTJ NEW/EST LOW 30: CPT

## 2023-04-18 PROCEDURE — 3079F DIAST BP 80-89 MM HG: CPT

## 2023-04-18 PROCEDURE — 3008F BODY MASS INDEX DOCD: CPT

## 2023-04-18 PROCEDURE — 3077F SYST BP >= 140 MM HG: CPT

## 2023-04-18 RX ORDER — TAMSULOSIN HYDROCHLORIDE 0.4 MG/1
0.4 CAPSULE ORAL DAILY
Qty: 90 CAPSULE | Refills: 0 | Status: SHIPPED | OUTPATIENT
Start: 2023-04-18 | End: 2023-07-17

## 2023-05-23 ENCOUNTER — OFFICE VISIT (OUTPATIENT)
Dept: INTERNAL MEDICINE CLINIC | Facility: CLINIC | Age: 53
End: 2023-05-23

## 2023-05-23 ENCOUNTER — NURSE TRIAGE (OUTPATIENT)
Dept: FAMILY MEDICINE CLINIC | Facility: CLINIC | Age: 53
End: 2023-05-23

## 2023-05-23 VITALS
DIASTOLIC BLOOD PRESSURE: 82 MMHG | SYSTOLIC BLOOD PRESSURE: 142 MMHG | BODY MASS INDEX: 38.08 KG/M2 | HEIGHT: 70 IN | WEIGHT: 266 LBS | HEART RATE: 73 BPM

## 2023-05-23 DIAGNOSIS — M25.511 ACUTE PAIN OF RIGHT SHOULDER: Primary | ICD-10-CM

## 2023-05-23 PROCEDURE — 99213 OFFICE O/P EST LOW 20 MIN: CPT | Performed by: NURSE PRACTITIONER

## 2023-05-23 PROCEDURE — 3079F DIAST BP 80-89 MM HG: CPT | Performed by: NURSE PRACTITIONER

## 2023-05-23 PROCEDURE — 3077F SYST BP >= 140 MM HG: CPT | Performed by: NURSE PRACTITIONER

## 2023-05-23 PROCEDURE — 3008F BODY MASS INDEX DOCD: CPT | Performed by: NURSE PRACTITIONER

## 2023-05-23 RX ORDER — CYCLOBENZAPRINE HCL 5 MG
5 TABLET ORAL NIGHTLY PRN
Qty: 10 TABLET | Refills: 0 | Status: SHIPPED | OUTPATIENT
Start: 2023-05-23

## 2023-05-23 RX ORDER — MELOXICAM 7.5 MG/1
7.5 TABLET ORAL DAILY PRN
Qty: 30 TABLET | Refills: 1 | Status: SHIPPED | OUTPATIENT
Start: 2023-05-23

## 2023-06-12 ENCOUNTER — OFFICE VISIT (OUTPATIENT)
Dept: SURGERY | Facility: CLINIC | Age: 53
End: 2023-06-12

## 2023-06-12 ENCOUNTER — NURSE TRIAGE (OUTPATIENT)
Dept: FAMILY MEDICINE CLINIC | Facility: CLINIC | Age: 53
End: 2023-06-12

## 2023-06-12 DIAGNOSIS — N40.1 BPH WITH OBSTRUCTION/LOWER URINARY TRACT SYMPTOMS: Primary | ICD-10-CM

## 2023-06-12 DIAGNOSIS — N13.8 BPH WITH OBSTRUCTION/LOWER URINARY TRACT SYMPTOMS: Primary | ICD-10-CM

## 2023-06-12 DIAGNOSIS — R35.0 URINARY FREQUENCY: ICD-10-CM

## 2023-06-12 RX ORDER — TAMSULOSIN HYDROCHLORIDE 0.4 MG/1
0.4 CAPSULE ORAL DAILY
Qty: 90 CAPSULE | Refills: 1 | Status: SHIPPED | OUTPATIENT
Start: 2023-06-12 | End: 2023-12-09

## 2023-06-12 NOTE — PROGRESS NOTES
[facility]  Follow-Up Visit    HPI: Ghada Christiansen is a 48year old male presents for a follow up visit. Patient was last seen on 4/18/2023. INTERVAL HISTORY:     Patient presents for a 2 month follow up for BPH with LUTS. Patient states urinary frequency improved along with nocturia. IPSS score of 10 (2/2/2/1/2/1/0), QoL index score of 1. Bladder scan PVR - 2 ml. Last visit patient complained of urinary frequency, sensation of incomplete bladder emptying, nocturia and weak stream. He was started on Flomax 0.4 mg.     Reviewed past medical, surgical, family, and social history. Reviewed med list and allergies. REVIEW OF SYSTEMS:  Pertinent positives and negatives per HPI. A 10-point ROS was performed and is otherwise negative. EXAM:  There were no vitals taken for this visit. Physical Exam  Constitutional:       Appearance: Normal appearance. HENT:      Head: Normocephalic. Pulmonary:      Effort: Pulmonary effort is normal.   Abdominal:      Palpations: Abdomen is soft. Musculoskeletal:         General: Normal range of motion. Cervical back: Normal range of motion. Skin:     General: Skin is warm. Neurological:      General: No focal deficit present. Mental Status: He is alert and oriented to person, place, and time. Psychiatric:         Mood and Affect: Mood normal.         Behavior: Behavior normal.           IMAGING:  No results found. IMPRESSION:  48year old male presents for a follow up for BPH. Discussed relevant anatomy and physiology with patient. Advised patient to avoid bladder irritants like caffienated, carbonated beverages along with alcohol. Educated patient to limit oral fluid intake 3 hours before bedtime.        PLAN:  - Continue Flomax 0.4 mg        MARAL Fajardo  6/12/2023

## 2023-06-13 ENCOUNTER — OFFICE VISIT (OUTPATIENT)
Dept: FAMILY MEDICINE CLINIC | Facility: CLINIC | Age: 53
End: 2023-06-13

## 2023-06-13 VITALS
HEIGHT: 70 IN | BODY MASS INDEX: 38.94 KG/M2 | HEART RATE: 82 BPM | SYSTOLIC BLOOD PRESSURE: 140 MMHG | TEMPERATURE: 98 F | DIASTOLIC BLOOD PRESSURE: 90 MMHG | WEIGHT: 272 LBS

## 2023-06-13 DIAGNOSIS — N52.9 ERECTILE DYSFUNCTION, UNSPECIFIED ERECTILE DYSFUNCTION TYPE: Primary | ICD-10-CM

## 2023-06-13 DIAGNOSIS — M54.6 ACUTE RIGHT-SIDED THORACIC BACK PAIN: ICD-10-CM

## 2023-06-13 DIAGNOSIS — R03.0 ELEVATED BLOOD PRESSURE READING: ICD-10-CM

## 2023-06-13 DIAGNOSIS — M25.511 ACUTE PAIN OF RIGHT SHOULDER: ICD-10-CM

## 2023-06-13 PROCEDURE — 3077F SYST BP >= 140 MM HG: CPT | Performed by: FAMILY MEDICINE

## 2023-06-13 PROCEDURE — 99214 OFFICE O/P EST MOD 30 MIN: CPT | Performed by: FAMILY MEDICINE

## 2023-06-13 PROCEDURE — 3008F BODY MASS INDEX DOCD: CPT | Performed by: FAMILY MEDICINE

## 2023-06-13 PROCEDURE — 3080F DIAST BP >= 90 MM HG: CPT | Performed by: FAMILY MEDICINE

## 2023-06-13 RX ORDER — CYCLOBENZAPRINE HCL 5 MG
5 TABLET ORAL NIGHTLY
Qty: 30 TABLET | Refills: 0 | Status: SHIPPED | OUTPATIENT
Start: 2023-06-13

## 2023-06-13 RX ORDER — TADALAFIL 20 MG/1
20 TABLET ORAL AS NEEDED
Qty: 24 TABLET | Refills: 3 | Status: SHIPPED | OUTPATIENT
Start: 2023-06-13

## 2023-06-13 NOTE — PATIENT INSTRUCTIONS
Cialis prescribed. Note to the patient's employer excusing him from work without penalty between June 13, 2023 and July 2, 2023 to return to work on Monday, July 3, 2023. Continue with physical therapy. Upper body/back and cervical region stretches highly recommended. Cyclobenzaprine refilled.

## 2023-09-19 ENCOUNTER — TELEPHONE (OUTPATIENT)
Dept: SURGERY | Facility: CLINIC | Age: 53
End: 2023-09-19

## 2023-09-19 NOTE — TELEPHONE ENCOUNTER
tamsulosin 0.4 MG Oral Cap, Take 1 capsule (0.4 mg total) by mouth daily. Take 1/2 hour following the same meal each day, Disp: 90 capsule, Rfl: 1    Patient stated this med really isn't working. He was informed by previous provider that if it wasn't working to let us know. I informed him (per clinic) that he should make an appointment.

## 2023-10-04 NOTE — TELEPHONE ENCOUNTER
I s/w pt and informed him of [facility]'s msg as stated below and he stated he will c/b to make an appt.

## 2023-11-17 ENCOUNTER — LAB ENCOUNTER (OUTPATIENT)
Dept: LAB | Age: 53
End: 2023-11-17
Payer: COMMERCIAL

## 2023-11-17 ENCOUNTER — OFFICE VISIT (OUTPATIENT)
Dept: FAMILY MEDICINE CLINIC | Facility: CLINIC | Age: 53
End: 2023-11-17

## 2023-11-17 VITALS
OXYGEN SATURATION: 98 % | SYSTOLIC BLOOD PRESSURE: 138 MMHG | HEIGHT: 70 IN | HEART RATE: 62 BPM | RESPIRATION RATE: 17 BRPM | TEMPERATURE: 98 F | BODY MASS INDEX: 36.94 KG/M2 | WEIGHT: 258 LBS | DIASTOLIC BLOOD PRESSURE: 90 MMHG

## 2023-11-17 DIAGNOSIS — R03.0 ELEVATED BLOOD PRESSURE READING: ICD-10-CM

## 2023-11-17 DIAGNOSIS — R10.30 LOWER ABDOMINAL PAIN: Primary | ICD-10-CM

## 2023-11-17 DIAGNOSIS — R39.15 URINARY URGENCY: ICD-10-CM

## 2023-11-17 DIAGNOSIS — R10.30 LOWER ABDOMINAL PAIN: ICD-10-CM

## 2023-11-17 LAB
ALBUMIN SERPL-MCNC: 4.5 G/DL (ref 3.2–4.8)
ALBUMIN/GLOB SERPL: 1.5 {RATIO} (ref 1–2)
ALP LIVER SERPL-CCNC: 66 U/L
ALT SERPL-CCNC: 19 U/L
ANION GAP SERPL CALC-SCNC: 5 MMOL/L (ref 0–18)
AST SERPL-CCNC: 20 U/L (ref ?–34)
BASOPHILS # BLD AUTO: 0.04 X10(3) UL (ref 0–0.2)
BASOPHILS NFR BLD AUTO: 0.6 %
BILIRUB SERPL-MCNC: 0.3 MG/DL (ref 0.3–1.2)
BILIRUB UR QL: NEGATIVE
BUN BLD-MCNC: 9 MG/DL (ref 9–23)
BUN/CREAT SERPL: 8.8 (ref 10–20)
CALCIUM BLD-MCNC: 9.5 MG/DL (ref 8.7–10.4)
CHLORIDE SERPL-SCNC: 105 MMOL/L (ref 98–112)
CLARITY UR: CLEAR
CO2 SERPL-SCNC: 29 MMOL/L (ref 21–32)
CREAT BLD-MCNC: 1.02 MG/DL
DEPRECATED RDW RBC AUTO: 43.1 FL (ref 35.1–46.3)
EGFRCR SERPLBLD CKD-EPI 2021: 89 ML/MIN/1.73M2 (ref 60–?)
EOSINOPHIL # BLD AUTO: 0.14 X10(3) UL (ref 0–0.7)
EOSINOPHIL NFR BLD AUTO: 2 %
ERYTHROCYTE [DISTWIDTH] IN BLOOD BY AUTOMATED COUNT: 13.4 % (ref 11–15)
FASTING STATUS PATIENT QL REPORTED: NO
GLOBULIN PLAS-MCNC: 3 G/DL (ref 2.8–4.4)
GLUCOSE BLD-MCNC: 76 MG/DL (ref 70–99)
GLUCOSE UR-MCNC: NORMAL MG/DL
HCT VFR BLD AUTO: 42.1 %
HGB BLD-MCNC: 14.2 G/DL
HGB UR QL STRIP.AUTO: NEGATIVE
IMM GRANULOCYTES # BLD AUTO: 0.01 X10(3) UL (ref 0–1)
IMM GRANULOCYTES NFR BLD: 0.1 %
KETONES UR-MCNC: NEGATIVE MG/DL
LEUKOCYTE ESTERASE UR QL STRIP.AUTO: NEGATIVE
LYMPHOCYTES # BLD AUTO: 3.77 X10(3) UL (ref 1–4)
LYMPHOCYTES NFR BLD AUTO: 54.9 %
MCH RBC QN AUTO: 29.5 PG (ref 26–34)
MCHC RBC AUTO-ENTMCNC: 33.7 G/DL (ref 31–37)
MCV RBC AUTO: 87.3 FL
MONOCYTES # BLD AUTO: 0.66 X10(3) UL (ref 0.1–1)
MONOCYTES NFR BLD AUTO: 9.6 %
NEUTROPHILS # BLD AUTO: 2.25 X10 (3) UL (ref 1.5–7.7)
NEUTROPHILS # BLD AUTO: 2.25 X10(3) UL (ref 1.5–7.7)
NEUTROPHILS NFR BLD AUTO: 32.8 %
NITRITE UR QL STRIP.AUTO: NEGATIVE
OSMOLALITY SERPL CALC.SUM OF ELEC: 285 MOSM/KG (ref 275–295)
PH UR: 7 [PH] (ref 5–8)
PLATELET # BLD AUTO: 267 10(3)UL (ref 150–450)
POTASSIUM SERPL-SCNC: 4.2 MMOL/L (ref 3.5–5.1)
PROT SERPL-MCNC: 7.5 G/DL (ref 5.7–8.2)
RBC # BLD AUTO: 4.82 X10(6)UL
SODIUM SERPL-SCNC: 139 MMOL/L (ref 136–145)
SP GR UR STRIP: 1.03 (ref 1–1.03)
UROBILINOGEN UR STRIP-ACNC: 2
WBC # BLD AUTO: 6.9 X10(3) UL (ref 4–11)

## 2023-11-17 PROCEDURE — 36415 COLL VENOUS BLD VENIPUNCTURE: CPT

## 2023-11-17 PROCEDURE — 85025 COMPLETE CBC W/AUTO DIFF WBC: CPT

## 2023-11-17 PROCEDURE — 81003 URINALYSIS AUTO W/O SCOPE: CPT

## 2023-11-17 PROCEDURE — 3080F DIAST BP >= 90 MM HG: CPT

## 2023-11-17 PROCEDURE — 3075F SYST BP GE 130 - 139MM HG: CPT

## 2023-11-17 PROCEDURE — 3008F BODY MASS INDEX DOCD: CPT

## 2023-11-17 PROCEDURE — 80053 COMPREHEN METABOLIC PANEL: CPT

## 2023-11-17 PROCEDURE — 99213 OFFICE O/P EST LOW 20 MIN: CPT

## 2023-11-18 LAB
COMPLEXED PSA SERPL-MCNC: 0.99 NG/ML (ref ?–4)
COMPLEXED PSA SERPL-MCNC: 1.36 NG/ML (ref ?–4)

## 2023-12-16 DIAGNOSIS — N52.9 ERECTILE DYSFUNCTION, UNSPECIFIED ERECTILE DYSFUNCTION TYPE: ICD-10-CM

## 2023-12-18 RX ORDER — TADALAFIL 20 MG/1
20 TABLET ORAL AS NEEDED
Qty: 24 TABLET | Refills: 3 | Status: SHIPPED | OUTPATIENT
Start: 2023-12-18

## 2023-12-18 NOTE — TELEPHONE ENCOUNTER
Refill passed per [facility], [facility] protocol. Requested Prescriptions   Pending Prescriptions Disp Refills    Tadalafil (CIALIS) 20 MG Oral Tab 24 tablet 3     Sig: Take 1 tablet (20 mg total) by mouth as needed for Erectile Dysfunction.        Genitourinary Medications Passed - 12/16/2023  8:56 PM        Passed - Patient does not have pulmonary hypertension on problem list        Passed - In person appointment or virtual visit in the past 12 mos or appointment in next 3 mos     Recent Outpatient Visits              1 month ago Lower abdominal pain    [facility], [address] [facility] Brock Merino Carl Angers, APRN    Office Visit    6 months ago Erectile dysfunction, unspecified erectile dysfunction type    [facility], [place] [address], [facility] Magdalene Che, DO    Office Visit    6 months ago BPH with obstruction/lower urinary tract symptoms    [facility], [address], [facility], APRN    Office Visit    6 months ago Acute pain of right shoulder    [facility], [facility]., APRN    Office Visit    8 months ago Urinary frequency    [facility], [address], APRN    Office Visit                         Recent Outpatient Visits              1 month ago Lower abdominal pain    [facility], [address] [facility] [place], Rodolfo Reyes, APRN    Office Visit    6 months ago Erectile dysfunction, unspecified erectile dysfunction type    [facility], [facility], [place], Magdalene Che, DO    Office Visit    6 months ago BPH with obstruction/lower urinary tract symptoms    [facility], [address], [facility], APRN    Office Visit    6 months ago Acute pain of right shoulder    [facility], [facility] [place]., APRN    Office Visit    8 months ago Urinary frequency Kelli Jones, [facility] [address], APRN    Office Visit

## 2024-03-14 ENCOUNTER — HOSPITAL ENCOUNTER (EMERGENCY)
Facility: HOSPITAL | Age: 54
Discharge: HOME | End: 2024-03-14
Attending: EMERGENCY MEDICINE
Payer: COMMERCIAL

## 2024-03-14 ENCOUNTER — APPOINTMENT (OUTPATIENT)
Dept: GENERAL RADIOLOGY | Facility: HOSPITAL | Age: 54
End: 2024-03-14
Attending: EMERGENCY MEDICINE
Payer: COMMERCIAL

## 2024-03-14 VITALS
WEIGHT: 260 LBS | DIASTOLIC BLOOD PRESSURE: 92 MMHG | HEIGHT: 70 IN | OXYGEN SATURATION: 98 % | HEART RATE: 73 BPM | TEMPERATURE: 99 F | SYSTOLIC BLOOD PRESSURE: 159 MMHG | BODY MASS INDEX: 37.22 KG/M2 | RESPIRATION RATE: 18 BRPM

## 2024-03-14 DIAGNOSIS — R07.81 RIB PAIN: Primary | ICD-10-CM

## 2024-03-14 LAB
BILIRUB UR QL: NEGATIVE
CLARITY UR: CLEAR
GLUCOSE UR-MCNC: NORMAL MG/DL
HGB UR QL STRIP.AUTO: NEGATIVE
KETONES UR-MCNC: NEGATIVE MG/DL
LEUKOCYTE ESTERASE UR QL STRIP.AUTO: NEGATIVE
NITRITE UR QL STRIP.AUTO: NEGATIVE
PH UR: 6 [PH] (ref 5–8)
PROT UR-MCNC: NEGATIVE MG/DL
SP GR UR STRIP: >1.03 (ref 1–1.03)
UROBILINOGEN UR STRIP-ACNC: NORMAL

## 2024-03-14 PROCEDURE — 99284 EMERGENCY DEPT VISIT MOD MDM: CPT

## 2024-03-14 PROCEDURE — 71101 X-RAY EXAM UNILAT RIBS/CHEST: CPT | Performed by: EMERGENCY MEDICINE

## 2024-03-14 PROCEDURE — 81003 URINALYSIS AUTO W/O SCOPE: CPT

## 2024-03-14 RX ORDER — IBUPROFEN 600 MG/1
600 TABLET ORAL ONCE
Status: COMPLETED | OUTPATIENT
Start: 2024-03-14 | End: 2024-03-14

## 2024-03-14 RX ORDER — HYDROCODONE BITARTRATE AND ACETAMINOPHEN 5; 325 MG/1; MG/1
1 TABLET ORAL ONCE
Status: COMPLETED | OUTPATIENT
Start: 2024-03-14 | End: 2024-03-14

## 2024-03-14 RX ORDER — HYDROCODONE BITARTRATE AND ACETAMINOPHEN 10; 325 MG/1; MG/1
TABLET ORAL EVERY 4 HOURS PRN
Qty: 5 TABLET | Refills: 0 | Status: SHIPPED | OUTPATIENT
Start: 2024-03-14 | End: 2024-03-21

## 2024-03-14 NOTE — ED INITIAL ASSESSMENT (HPI)
Patient ambulatory to ED with complaint of right flank pain for one week. Denies any problems with using the bathroom. Denies fevers.      Patient is AXOX4.

## 2024-03-15 ENCOUNTER — TELEPHONE (OUTPATIENT)
Dept: FAMILY MEDICINE CLINIC | Facility: CLINIC | Age: 54
End: 2024-03-15

## 2024-03-15 NOTE — ED PROVIDER NOTES
Patient Seen in: [facility] Emergency Department      History     Chief Complaint   Patient presents with    Flank Pain     Stated Complaint: abd pain    Subjective:   53-year-old male who states he has no significant history here with 1 week of a pain in his left lower ribs near the posterior axillary line.  He says it hurts if he sneezes coughs or does a certain movement such as getting up from a seated position.  He does not have a persistent cough.  No fever.  No vomiting or diarrhea.  No weakness or numbness.  No blood in his urine.  No nausea.  No significant trauma or accidents.  Tried some ibuprofen about 9 hours ago it does not seem to think it helps.  When he finds a comfortable position or is not moving he has no pain at all.  No midline back pain.            Objective:   Past Medical History:   Diagnosis Date    Anxiety state, unspecified     Lipid screening 06-    per [employer]    Screening PSA (prostate specific antigen) 06-    per [facility]    Unspecified essential hypertension               History reviewed. No pertinent surgical history.             Social History     Socioeconomic History    Marital status:    Tobacco Use    Smoking status: Some Days     Packs/day: 2.00     Years: 10.00     Additional pack years: 0.00     Total pack years: 20.00     Types: Cigars, Cigarettes    Smokeless tobacco: Never    Tobacco comments:     Please verify with patient.  Per [facility]:  \"Tobacco Use - No.\"   Vaping Use    Vaping Use: Never used   Substance and Sexual Activity    Alcohol use: Yes     Alcohol/week: 0.0 standard drinks of alcohol     Comment: (hard liquor) 4 glasses weekly    Drug use: No   Other Topics Concern    Caffeine Concern Yes     Comment: (Coffee, Soda)              Review of Systems    Positive for stated complaint: abd pain  Other systems are as noted in HPI.  Constitutional and vital signs reviewed.      All other systems reviewed and negative except as noted  above.    Physical Exam     ED Triage Vitals [03/14/24 1817]   BP (!) 185/82   Pulse 64   Resp 18   Temp 98.6 °F (37 °C)   Temp src Temporal   SpO2 100 %   O2 Device None (Room air)       Current:BP (!) 159/92   Pulse 73   Temp 98.6 °F (37 °C) (Temporal)   Resp 18   Ht 177.8 cm (5' 10\")   Wt 117.9 kg   SpO2 98%   BMI 37.31 kg/m²         Physical Exam  Constitutional:       Appearance: He is obese.   HENT:      Head: Normocephalic.      Right Ear: External ear normal.      Left Ear: External ear normal.      Nose: Nose normal.      Mouth/Throat:      Mouth: Mucous membranes are moist.   Eyes:      Extraocular Movements: Extraocular movements intact.      Pupils: Pupils are equal, round, and reactive to light.   Cardiovascular:      Rate and Rhythm: Normal rate and regular rhythm.      Pulses: Normal pulses.   Pulmonary:      Effort: Pulmonary effort is normal.   Abdominal:      Palpations: Abdomen is soft.      Tenderness: There is no abdominal tenderness.   Musculoskeletal:         General: Normal range of motion.      Cervical back: Normal range of motion.      Comments: There is some tenderness in the floating ribs and rib #10 near the posterior axillary line on the left.  No crepitus or bruising noted   Skin:     General: Skin is warm.      Capillary Refill: Capillary refill takes less than 2 seconds.   Neurological:      Mental Status: He is alert.      Sensory: No sensory deficit.      Motor: No weakness.               ED Course     Labs Reviewed   URINALYSIS, ROUTINE - Abnormal; Notable for the following components:       Result Value    Spec Gravity >1.030 (*)     All other components within normal limits          ED Course as of 03/15/24 1651  ------------------------------------------------------------  Time: 03/15 1648  Comment: Labs and xray ind interp by me,  UA shows not urine, ribs/cxr was wnl.  Pt did well, is reproducible and I considered labs but wasn't indicated.  Bp high and we discussed  this              MDM      XR RIBS WITH CHEST (3 VIEWS), LEFT  (CPT=71101)    Result Date: 3/14/2024  CONCLUSION:  1. No acute left rib fracture. 2. Negative for radiographically evident acute intrathoracic process.   Dictated by (CST): Oliverio Bailey MD on 3/14/2024 at 7:45 PM     Finalized by (CST): Oliverio Bailey MD on 3/14/2024 at 7:47 PM                                            Medical Decision Making  Patient with reproducible left lower left lateral posterior rib pain.  Could be muscular, cartilage injury, fracture, pneumothorax, renal colic and retroperitoneal problems less likely.  He is not a heavy drinker.  He has no nausea vomiting.  No urinary symptoms.  Will do rib x-rays give some ibuprofen and recheck.  Triage blood pressure was elevated and we will recheck this and give him good follow-up recommendations.  Pulse ox normal 100% on room air.  Lungs were clear    Amount and/or Complexity of Data Reviewed  External Data Reviewed: notes.     Details: Office note from 6/23 reviewed he had borderline high bp at that time  Radiology: ordered and independent interpretation performed. Decision-making details documented in ED Course.  Discussion of management or test interpretation with external provider(s): See ed course    Risk  Prescription drug management.        Disposition and Plan     Clinical Impression:  1. Rib pain         Disposition:  Discharge  3/14/2024  9:58 pm    Follow-up:  Surjit Reeves, DO  [address] [place] [address]  [phone]    Follow up            Medications Prescribed:  Discharge Medication List as of 3/14/2024 10:04 PM        START taking these medications    Details   HYDROcodone-acetaminophen  MG Oral Tab Take 0.5-1 tablets by mouth every 4 (four) hours as needed for Pain., Normal, Disp-5 tablet, R-0

## 2024-03-15 NOTE — TELEPHONE ENCOUNTER
Pt wants an ER followup w/ Dr CECI Reeves  He was at  er 3-14-24     Pt already has an appt 3-19 w/ GEORGE Cyr but wants to see Dr Reeves as well    Call him at:[phone]

## 2024-03-15 NOTE — DISCHARGE INSTRUCTIONS
Norco as needed for pain.  Ibuprofen every 8 hours as needed.  Read instructions for further recommendations.  Avoid heavy lifting.  Follow-up with your doctor for reevaluation.

## 2024-03-19 ENCOUNTER — OFFICE VISIT (OUTPATIENT)
Dept: FAMILY MEDICINE CLINIC | Facility: CLINIC | Age: 54
End: 2024-03-19

## 2024-03-19 VITALS
HEART RATE: 62 BPM | WEIGHT: 267 LBS | OXYGEN SATURATION: 96 % | BODY MASS INDEX: 38.22 KG/M2 | DIASTOLIC BLOOD PRESSURE: 86 MMHG | HEIGHT: 70 IN | TEMPERATURE: 98 F | SYSTOLIC BLOOD PRESSURE: 142 MMHG

## 2024-03-19 DIAGNOSIS — R03.0 ELEVATED BLOOD PRESSURE READING: ICD-10-CM

## 2024-03-19 DIAGNOSIS — R07.81 RIB PAIN: Primary | ICD-10-CM

## 2024-03-19 DIAGNOSIS — R10.9 LEFT FLANK PAIN: ICD-10-CM

## 2024-03-19 DIAGNOSIS — M79.10 MYALGIA: ICD-10-CM

## 2024-03-19 PROCEDURE — 3008F BODY MASS INDEX DOCD: CPT

## 2024-03-19 PROCEDURE — 3079F DIAST BP 80-89 MM HG: CPT

## 2024-03-19 PROCEDURE — 99214 OFFICE O/P EST MOD 30 MIN: CPT

## 2024-03-19 PROCEDURE — 3077F SYST BP >= 140 MM HG: CPT

## 2024-03-19 NOTE — PROGRESS NOTES
Edson Silva is a 54 year old male.  Chief Complaint   Patient presents with    Follow - Up     Here to follow up ER visit last week for pain to left lower back after sneezing. Pt was given pain medication which made him very drowsy.     HPI:   Edson Silva presented to the clinic for ER follow-up 3/14/2024 for left flank pain/rib pain after sneezing.  Onset x 1 day.  Did not improve as the day progressed.  Denied chest pain, shortness of breath, dizziness, headache, change in vision.  Chest x-ray negative for acute fractures.  Patient prescribed Norco as needed for pain.    Today, states symptoms improved.  No longer having pain.  Normal range of motion.  Denies chest pain, shortness of breath, dizziness, headache, change in vision, dysuria, urgency, frequency.      Current Outpatient Medications   Medication Sig Dispense Refill    HYDROcodone-acetaminophen  MG Oral Tab Take 0.5-1 tablets by mouth every 4 (four) hours as needed for Pain. 5 tablet 0    Tadalafil (CIALIS) 20 MG Oral Tab Take 1 tablet (20 mg total) by mouth as needed for Erectile Dysfunction. (Patient not taking: Reported on 3/19/2024) 24 tablet 3    cyclobenzaprine 5 MG Oral Tab Take 1 tablet (5 mg total) by mouth nightly. (Patient not taking: Reported on 3/19/2024) 30 tablet 0    Meloxicam 7.5 MG Oral Tab Take 1 tablet (7.5 mg total) by mouth daily as needed for Pain. (Patient not taking: Reported on 3/19/2024) 30 tablet 1      Past Medical History:   Diagnosis Date    Anxiety state, unspecified     Lipid screening 06-    per [facility]    Screening PSA (prostate specific antigen) 06-    per [facility]    Unspecified essential hypertension       No past surgical history on file.   Social History:  Social History     Socioeconomic History    Marital status:    Tobacco Use    Smoking status: Some Days     Packs/day: 2.00     Years: 10.00     Additional pack years: 0.00     Total pack years: 20.00     Types: Cigars,  Cigarettes    Smokeless tobacco: Never    Tobacco comments:     Please verify with patient.  Per [facility]:  \"Tobacco Use - No.\"   Vaping Use    Vaping Use: Never used   Substance and Sexual Activity    Alcohol use: Yes     Alcohol/week: 0.0 standard drinks of alcohol     Comment: (hard liquor) 4 glasses weekly    Drug use: No   Other Topics Concern    Caffeine Concern Yes     Comment: (Coffee, Soda)      Family History   Problem Relation Age of Onset    Diabetes Mother       Allergies   Allergen Reactions    Shrimp         REVIEW OF SYSTEMS:   Review of Systems   Constitutional: Negative.    Eyes:  Negative for visual disturbance.   Respiratory: Negative.  Negative for shortness of breath.    Cardiovascular: Negative.  Negative for chest pain and palpitations.   Genitourinary:  Negative for dysuria, flank pain, frequency and hematuria.   Musculoskeletal:  Negative for back pain, myalgias, neck pain and neck stiffness.   Neurological: Negative.  Negative for dizziness and headaches.   Psychiatric/Behavioral: Negative.     All other systems reviewed and are negative.     Wt Readings from Last 5 Encounters:   03/19/24 271 lb (122.9 kg)   03/14/24 260 lb (117.9 kg)   11/17/23 258 lb (117 kg)   06/13/23 272 lb (123.4 kg)   05/23/23 266 lb (120.7 kg)     Body mass index is 38.88 kg/m².      EXAM:   BP (!) 172/80 (BP Location: Left arm, Patient Position: Sitting, Cuff Size: large)   Pulse 62   Temp 97.6 °F (36.4 °C) (Temporal)   Ht 5' 10\" (1.778 m)   Wt 271 lb (122.9 kg)   SpO2 96%   BMI 38.88 kg/m²   Physical Exam  Vitals and nursing note reviewed.   Constitutional:       Appearance: Normal appearance.   HENT:      Head: Normocephalic and atraumatic.   Cardiovascular:      Rate and Rhythm: Normal rate and regular rhythm.      Pulses: Normal pulses.      Heart sounds: Normal heart sounds.   Pulmonary:      Effort: Pulmonary effort is normal.      Breath sounds: Normal breath sounds.   Abdominal:      Tenderness: There  is no right CVA tenderness or left CVA tenderness.   Musculoskeletal:      Cervical back: No spasms, tenderness or bony tenderness. Normal range of motion.      Thoracic back: Normal. No tenderness. Normal range of motion.      Lumbar back: Normal. No tenderness. Normal range of motion. Negative right straight leg raise test and negative left straight leg raise test.   Neurological:      General: No focal deficit present.      Mental Status: He is alert and oriented to person, place, and time.   Psychiatric:         Mood and Affect: Mood normal.         Behavior: Behavior normal.            ASSESSMENT AND PLAN:   (R07.81) Rib pain  (primary encounter diagnosis)  (R10.9) Left flank pain  (M79.10) Myalgia  Plan: ER follow-up for left flank/right rib pain.  Reviewed x-ray-unremarkable.  Reviewed note.  Likely musculoskeletal etiology.  Patient states all symptoms have resolved.  Denies pain today.  Normal exam.  Advised to notify if symptoms return    (R03.0) Elevated blood pressure reading  Plan: Blood pressure elevated in office.  Improved during visit.  Checked manually.  Asymptomatic.  Has blood pressure machine at home.  Advised to monitor blood pressure at home.  Notify if greater than 140/90.  Advised to develop chest pain, shortness of breath, dizziness, headache, change in vision go to the ER.      Follow-up as needed    The patient indicates understanding of these issues and agrees to the plan.    This note was prepared using Dragon Medical voice recognition dictation software. As a result errors may occur. When identified these errors have been corrected. While every attempt is made to correct errors during dictation discrepancies may still exist.

## (undated) NOTE — ED AVS SNAPSHOT
Deniz Roper   MRN: [id number]    Department:  [facility] Emergency Department   Date of Visit:  12/28/2017           Disclosure     Insurance plans vary and the physician(s) referred by the ER may not be covered by your plan.  Please contact CARE PHYSICIAN AT ONCE OR RETURN IMMEDIATELY TO THE EMERGENCY DEPARTMENT. If you have been prescribed any medication(s), please fill your prescription right away and begin taking the medication(s) as directed.   If you believe that any of the medications

## (undated) NOTE — ED AVS SNAPSHOT
Jef Tyler   MRN: [id number]    Department:  [facility] Emergency Department   Date of Visit:  9/18/2017           Disclosure     Insurance plans vary and the physician(s) referred by the ER may not be covered by your plan.  Please contact y CARE PHYSICIAN AT ONCE OR RETURN IMMEDIATELY TO THE EMERGENCY DEPARTMENT. If you have been prescribed any medication(s), please fill your prescription right away and begin taking the medication(s) as directed.   If you believe that any of the medications

## (undated) NOTE — MR AVS SNAPSHOT
[facility]  [address], [address]  [phone]               Thank you for choosing us for your health care visit with Parul Lambert DO.   We are glad to serve you and happy to provide you with this sum These medications were sent to [address], [address] [facility] AT [facility], [phone], [address], [address] [facility] [address] [address]     Phone:  [phone]    - Doxycycline Hyclate 10

## (undated) NOTE — ED AVS SNAPSHOT
[facility] Emergency Department    [address]     [address] [place] [address]    Phone:  [phone]    Fax:  [phone]           Jodee Hoffman   MRN: [id number]    Department:  [facility] Emergency Department   Date of Visit:  5/24/ Si tiene problemas para programar papo alexi de seguimiento según lo indicado, llame al encargado de lydia al [phone]. It is our goal to assure that you are completely satisfied with every aspect of your visit today.   In an effort to constantly impr list to your next doctor's appointment. Any imaging studies and labs completed today can be reviewed in your MyChart account. You may have had testing done that requires us to contact you. Please make sure we have your correct phone number on file. Sign up for [employer], your secure online medical record. [employer] will allow you to access patient instructions from your recent visit,  view other health information, and more. To sign up or find more information, go to [email or web address] and cl

## (undated) NOTE — LETTER
6/13/2023      REGARDING:        Mariel Nichols        [address] [facility]. [address] [place] [address]         To whom it may concern as a pertains to the above named employee/patient:    I am the primary care provider for Scottie. He is under my care and recommendations regarding any and all medical issues. He is currently being evaluated and treated via specialist which requires physical therapy. Please excuse him from his duties and do not penalized him beginning on Tuesday, June 13, 2023 through Sunday, July 2, 2023. This would mean that the patient will return to work on Monday, July 3, 2023. If you have any questions, please feel free to call. Sincerely,    Ge Diana DO  [facility], [address] [address] [phone]        Document electronically generated by:   Ge Diana DO

## (undated) NOTE — MR AVS SNAPSHOT
[facility]  [address], [address] [facility]  [phone]               Thank you for choosing us for your health care visit with Arnol Randall MD.  We are glad to serve you and happy to provide you with this summary o Support Staff. Remember, MyChart is NOT to be used for urgent needs. For medical emergencies, dial 911. Educational Information     Your blood pressure indicates you may be at-risk for Hypertension.    Please consider the following Lifestyle Modific active are less likely to develop some chronic diseases than adults who are inactive.      HOW TO GET STARTED: HOW TO STAY MOTIVATED:   Start activities slowly and build up over time Do what you like   Get your heart pumping – brisk walking, biking, swimmin

## (undated) NOTE — LETTER
Date & Time: 11/29/2020, 8:03 PM  Patient: Ryanne Rodrigez  Encounter Provider(s):    MARAL oCrral       To Whom It May Concern:    Ryanne Rodrigez was seen and treated in our department on 11/29/2020. He can return to work on 12/1/2020.     If y

## (undated) NOTE — LETTER
September 18, 2017    Patient: Holden Villarreal   Date of Visit: 9/18/2017       To Whom It May Concern:    Holden Villarreal was seen and treated in our emergency department on 9/18/2017. He can return to work.     If you have any questions or concerns, ple

## (undated) NOTE — ED AVS SNAPSHOT
[facility] Emergency Department    [address]     [address] [place] [address]    Phone:  [phone]    Fax:  [phone]           Dior Hernandez   MRN: [id number]    Department:  [facility] Emergency Department   Date of Visit:  5/24/ and Class Registration line at [phone] or find a doctor online by visiting [email or web address].    IF THERE IS ANY CHANGE OR WORSENING OF YOUR CONDITION, CALL YOUR PRIMARY CARE PHYSICIAN AT ONCE OR RETURN IMMEDIATELY TO [address] [place] [address].     If

## (undated) NOTE — LETTER
5/23/2023          To Whom It May Concern:    Cathie Carter is currently under my medical care and may not return to work at this time. Please excuse Griffinalberto Carrasquillo from 5/23/23 - 6/6/23 due to condition that requires follow up evaluation and treatment. He will follow up in 2 weeks to determine his status. If you require additional information please contact our office.         Sincerely,    MARAL Yung